# Patient Record
Sex: MALE | Race: BLACK OR AFRICAN AMERICAN | NOT HISPANIC OR LATINO | Employment: FULL TIME | URBAN - METROPOLITAN AREA
[De-identification: names, ages, dates, MRNs, and addresses within clinical notes are randomized per-mention and may not be internally consistent; named-entity substitution may affect disease eponyms.]

---

## 2017-10-30 ENCOUNTER — HOSPITAL ENCOUNTER (EMERGENCY)
Facility: OTHER | Age: 27
Discharge: HOME | End: 2017-10-30
Attending: EMERGENCY MEDICINE

## 2017-10-30 VITALS
RESPIRATION RATE: 16 BRPM | BODY MASS INDEX: 22.22 KG/M2 | OXYGEN SATURATION: 100 % | HEIGHT: 69 IN | DIASTOLIC BLOOD PRESSURE: 73 MMHG | SYSTOLIC BLOOD PRESSURE: 130 MMHG | WEIGHT: 150 LBS | TEMPERATURE: 99 F | HEART RATE: 72 BPM

## 2017-10-30 DIAGNOSIS — S62.639B OPEN FRACTURE OF TUFT OF DISTAL PHALANX OF FINGER: Primary | ICD-10-CM

## 2017-10-30 PROCEDURE — 29130 APPL FINGER SPLINT STATIC: CPT | Mod: F4

## 2017-10-30 PROCEDURE — 90715 TDAP VACCINE 7 YRS/> IM: CPT | Performed by: PHYSICIAN ASSISTANT

## 2017-10-30 PROCEDURE — 26750 TREAT FINGER FRACTURE EACH: CPT | Mod: F4

## 2017-10-30 PROCEDURE — 63600175 PHARM REV CODE 636 W HCPCS: Performed by: PHYSICIAN ASSISTANT

## 2017-10-30 PROCEDURE — 25000003 PHARM REV CODE 250: Performed by: PHYSICIAN ASSISTANT

## 2017-10-30 PROCEDURE — 90471 IMMUNIZATION ADMIN: CPT | Performed by: PHYSICIAN ASSISTANT

## 2017-10-30 PROCEDURE — 99284 EMERGENCY DEPT VISIT MOD MDM: CPT | Mod: 25

## 2017-10-30 RX ORDER — HYDROCODONE BITARTRATE AND ACETAMINOPHEN 5; 325 MG/1; MG/1
1 TABLET ORAL EVERY 6 HOURS PRN
Qty: 12 TABLET | Refills: 0 | Status: SHIPPED | OUTPATIENT
Start: 2017-10-30 | End: 2017-11-09

## 2017-10-30 RX ORDER — IBUPROFEN 600 MG/1
600 TABLET ORAL EVERY 6 HOURS PRN
Qty: 20 TABLET | Refills: 0 | Status: SHIPPED | OUTPATIENT
Start: 2017-10-30

## 2017-10-30 RX ORDER — HYDROCODONE BITARTRATE AND ACETAMINOPHEN 5; 325 MG/1; MG/1
1 TABLET ORAL
Status: COMPLETED | OUTPATIENT
Start: 2017-10-30 | End: 2017-10-30

## 2017-10-30 RX ORDER — CEPHALEXIN 500 MG/1
500 CAPSULE ORAL
Status: COMPLETED | OUTPATIENT
Start: 2017-10-30 | End: 2017-10-30

## 2017-10-30 RX ORDER — CEPHALEXIN 500 MG/1
500 CAPSULE ORAL 4 TIMES DAILY
Qty: 40 CAPSULE | Refills: 0 | Status: SHIPPED | OUTPATIENT
Start: 2017-10-30 | End: 2017-11-09

## 2017-10-30 RX ADMIN — BACITRACIN, NEOMYCIN, POLYMYXIN B 1 EACH: 400; 3.5; 5 OINTMENT TOPICAL at 02:10

## 2017-10-30 RX ADMIN — CLOSTRIDIUM TETANI TOXOID ANTIGEN (FORMALDEHYDE INACTIVATED), CORYNEBACTERIUM DIPHTHERIAE TOXOID ANTIGEN (FORMALDEHYDE INACTIVATED), BORDETELLA PERTUSSIS TOXOID ANTIGEN (GLUTARALDEHYDE INACTIVATED), BORDETELLA PERTUSSIS FILAMENTOUS HEMAGGLUTININ ANTIGEN (FORMALDEHYDE INACTIVATED), BORDETELLA PERTUSSIS PERTACTIN ANTIGEN, AND BORDETELLA PERTUSSIS FIMBRIAE 2/3 ANTIGEN 0.5 ML: 5; 2; 2.5; 5; 3; 5 INJECTION, SUSPENSION INTRAMUSCULAR at 02:10

## 2017-10-30 RX ADMIN — CEPHALEXIN 500 MG: 500 CAPSULE ORAL at 02:10

## 2017-10-30 RX ADMIN — HYDROCODONE BITARTRATE AND ACETAMINOPHEN 1 TABLET: 5; 325 TABLET ORAL at 01:10

## 2017-10-30 NOTE — ED PROVIDER NOTES
"Encounter Date: 10/30/2017       History     Chief Complaint   Patient presents with    Abrasion     + left 5th finger abrasion from metal pipe while at work approx 1 hour ago. " mY finger got all mashed up". Pt denies up to date with tetanus shot. Bleeding controlled with NS gauze in triage. Pt denies numbness or tingling to affected extremity. +2 pulses. Pt appears diaphoretic and states, " I just feel weak". Denies excessive blood loss     27-year-old male with no significant past medical history presents to the emergency department with complaints of left pinky finger crush injury.  He states that he was at work when 2 pipes smashed his finger between him.  He states that he was wearing gloves at the time.  He complains of wound to the distal aspect of his left pinky on the palmar side.  He complains of throbbing pain and pressure.  He denies any numbness or weakness.  He denies any other injury.  He states that this occurred just prior to arrival.  Last tetanus vaccine is unknown.  No current treatment. he complains of pain is a 10 out of 10      The history is provided by the patient.     Review of patient's allergies indicates:   Allergen Reactions    Betadine [povidone-iodine] Other (See Comments)     Pt unsure of type of reaction     History reviewed. No pertinent past medical history.  History reviewed. No pertinent surgical history.  History reviewed. No pertinent family history.  Social History   Substance Use Topics    Smoking status: Former Smoker    Smokeless tobacco: Never Used    Alcohol use Yes     Review of Systems   Constitutional: Negative for chills and fever.   HENT: Negative for sore throat.    Respiratory: Negative for shortness of breath.    Cardiovascular: Negative for chest pain.   Gastrointestinal: Negative for nausea and vomiting.   Genitourinary: Negative for dysuria.   Musculoskeletal: Negative for back pain.   Skin: Positive for wound (left pinky). Negative for rash. "   Neurological: Negative for weakness and numbness.   Hematological: Does not bruise/bleed easily.       Physical Exam     Initial Vitals [10/30/17 1305]   BP Pulse Resp Temp SpO2   (!) 164/89 83 16 97.7 °F (36.5 °C) 100 %      MAP       114         Physical Exam    Nursing note and vitals reviewed.  Constitutional: Vital signs are normal. He appears well-developed and well-nourished.  Non-toxic appearance. No distress.   HENT:   Head: Normocephalic and atraumatic.   Right Ear: External ear normal.   Left Ear: External ear normal.   Nose: Nose normal.   Eyes: Conjunctivae and lids are normal. No scleral icterus.   Neck: Normal range of motion and phonation normal. Neck supple.   Cardiovascular: Normal rate, regular rhythm and intact distal pulses.   Pulmonary/Chest: He is in respiratory distress.   Abdominal: Normal appearance.   Musculoskeletal: Normal range of motion.        Hands:  No obvious deformities, moving all extremities, normal gait  Left hand-avulsion of the distal aspect of the, left fifth digit down to the subcutaneous fat.  No subungual hematoma.  Full range of motion of the digit.  Nailbed is intact.  Pain with tenderness palpation to the distal aspect of the digit.  Capillary refill less than 3 seconds.  Intact distal pulses with no sensory deficits.  Examined throughout range of motion with no decreased range of motion.  Strength 5 out of 5.   Neurological: He is alert and oriented to person, place, and time. He has normal strength and normal reflexes. No sensory deficit.   Skin: Skin is warm, dry and intact. Capillary refill takes less than 2 seconds. No bruising, no ecchymosis, no lesion and no rash noted. No erythema.   Crush injury to the left distal pinky with avulsion of the skin to the palmar aspect down to the level of the subcutaneous fat   Psychiatric: He has a normal mood and affect. His speech is normal and behavior is normal. Judgment normal. Cognition and memory are normal.         ED  Course   Orthopedic Injury  Date/Time: 10/30/2017 2:31 PM  Performed by: SHIRIN GONG  Authorized by: MARY GRACE WILLINGHAM     Consent Done?:  Not Needed  Injury:     Injury location:  Finger    Location details:  Left little finger    Injury type:  Fracture    Fracture type: distal phalanx      MCP joint involved?: No      Any IP joint involved?: No        Pre-procedure assessment:     Neurovascular status: Neurovascularly intact      Distal perfusion: normal      Neurological function: normal      Range of motion: normal      Local anesthesia used?: No      Patient sedated?: No        Selections made in this section will also lock the Injury type section above.:     Manipulation performed?: No      Immobilization:  Splint    Splint type:  Static finger    Supplies used:  Aluminum splint    Complications: No      Specimens: No      Implants: No    Post-procedure assessment:     Neurovascular status: Neurovascularly intact      Distal perfusion: normal      Neurological function: normal      Patient tolerance:  Patient tolerated the procedure well with no immediate complications      Labs Reviewed - No data to display       X-Rays:   Independently Interpreted Readings:   Other Readings:  Left pinky- acute fracture of the distal phalanx    Medical Decision Making:   History:   Old Medical Records: I decided to obtain old medical records.  Initial Assessment:   27-year-old male with complaints consistent with possible open fracture of the left istal phalanx of the fifth digit.  Vital signs stable, afebrile, neurovascularly intact.  He is alert, healthy and nontoxic appearing.  He is in no apparent distress.  Exam documented above.  Consistent with avulsion of the palmar aspect of the distal fifth phalanx down to the subcutaneous fat.  He is a full range of motion of the digit with no evidence of tendon laceration.  He's got tenderness palpation to the distal aspect of the digit.  Nail is intact.  Independently  Interpreted Test(s):   I have ordered and independently interpreted X-rays - see prior notes.  Clinical Tests:   Radiological Study: Ordered and Reviewed  ED Management:  X-ray was obtained and independently interpreted by myself and consistent with acute fifth digit tuft fracture.  The wound was cleaned in its tetanus vaccine was updated.  The wound was dressed and he was administered Keflex.  He was placed in a finger splint.  He is stable will be discharged home with prescriptions for Keflex, Norco and ibuprofen as well as care instructions.  He is to follow-up with orthopedic hand specialist at first available appointment or return for any worsening signs or symptoms.  He states understanding.  This patient was discussed with the attending physician who agrees with treatment plan.  Other:   I have discussed this case with another health care provider.       <> Summary of the Discussion: Santhosh  This note was created using Dragon Medical dictation.  There may be typographical errors secondary to dictation.                     ED Course      Clinical Impression:     1. Open fracture of tuft of distal phalanx of finger        Disposition:   Disposition: Discharged  Condition: Stable                        Afia Mehta PA-C  10/30/17 1372

## 2017-10-30 NOTE — ED TRIAGE NOTES
Pt cleaning  lines and 2 pieces of pipe clamped sown and caught pt left 5th digit in between 2 pipes - pt was wearing gloves and didn't even feel finger get caught - pt states pad of pinky is missing